# Patient Record
Sex: FEMALE | Race: BLACK OR AFRICAN AMERICAN | NOT HISPANIC OR LATINO | Employment: FULL TIME | URBAN - METROPOLITAN AREA
[De-identification: names, ages, dates, MRNs, and addresses within clinical notes are randomized per-mention and may not be internally consistent; named-entity substitution may affect disease eponyms.]

---

## 2017-08-16 ENCOUNTER — OFFICE VISIT (OUTPATIENT)
Dept: RETAIL CLINIC | Facility: CLINIC | Age: 32
End: 2017-08-16

## 2017-08-16 VITALS
TEMPERATURE: 97.6 F | DIASTOLIC BLOOD PRESSURE: 82 MMHG | HEART RATE: 106 BPM | OXYGEN SATURATION: 98 % | RESPIRATION RATE: 16 BRPM | SYSTOLIC BLOOD PRESSURE: 120 MMHG

## 2017-08-16 DIAGNOSIS — B00.1 HERPES LABIALIS: Primary | ICD-10-CM

## 2017-08-16 PROCEDURE — 99203 OFFICE O/P NEW LOW 30 MIN: CPT | Performed by: NURSE PRACTITIONER

## 2017-08-16 RX ORDER — NORETHINDRONE ACETATE AND ETHINYL ESTRADIOL AND FERROUS FUMARATE 5-7-9-7
KIT ORAL DAILY
COMMUNITY

## 2017-08-16 RX ORDER — ACYCLOVIR 400 MG/1
400 TABLET ORAL
Qty: 25 TABLET | Refills: 0 | Status: SHIPPED | OUTPATIENT
Start: 2017-08-16 | End: 2017-08-21

## 2017-08-16 NOTE — PATIENT INSTRUCTIONS
Cold Sore  A cold sore, also called a fever blister, is a skin infection that is caused by a virus. This infection causes small, fluid-filled sores to form inside of the mouth or on the lips, gums, nose, chin, or cheeks. Cold sores can spread to other parts of the body, such as the eyes or fingers.  Cold sores can be spread or passed from person to person (contagious) until the sores crust over completely. Cold sores can be spread through close contact, such as kissing or sharing a drinking glass.  HOME CARE  Medicines  · Take or apply over-the-counter and prescription medicines only as told by your doctor.  · Use a cotton-tip swab to apply creams or gels to your sores.  Sore Care   · Do not touch the sores or pick the scabs.  · Wash your hands often. Do not touch your eyes without washing your hands first.  · Keep the sores clean and dry.  · If directed, apply ice to the sores:  · Put ice in a plastic bag.  · Place a towel between your skin and the bag.  · Leave the ice on for 20 minutes, 2-3 times per day.  Lifestyle  · Do not kiss, have oral sex, or share personal items until your sores heal.  · Eat a soft, bland diet. Avoid eating hot, cold, or salty foods. These can hurt your mouth.  · Use a straw if it hurts to drink out of a glass.  · Avoid the sun and limit your stress if these things trigger outbreaks. If sun causes cold sores, apply sunscreen on your lips before being out in the sun.  GET HELP IF:  · You have symptoms for more than two weeks.  · You have pus coming from the sores.  · You have redness that is spreading.  · You have pain or irritation in your eye.  · You get sores on your genitals.  · Your sores do not heal within two weeks.  · You get cold sores often.  GET HELP RIGHT AWAY IF:  · You have a fever and your symptoms suddenly get worse.  · You have a headache and confusion.     PLENTY OF REST.  PUSH FLUIDS.  TYLENOL/IBUPROFEN AS NEEDED PER PACKAGE INSTRUCTIONS.  FOLLOW UP WITH PCP NEXT WEEK AS  SCHEDULED.   This information is not intended to replace advice given to you by your health care provider. Make sure you discuss any questions you have with your health care provider.     Document Released: 06/18/2013 Document Revised: 04/10/2017 Document Reviewed: 10/07/2016  [employer] Interactive Patient Education ©2017 [employer]

## 2017-08-16 NOTE — PROGRESS NOTES
Subjective   Patient ID: Amadou Paredes is a 32 y.o. female presents with   Chief Complaint   Patient presents with   • Mouth Lesions       Mouth Lesions    The current episode started more than 1 week ago. The problem occurs continuously. The problem has been unchanged. The problem is moderate. Nothing relieves the symptoms. Nothing aggravates the symptoms. Associated symptoms include mouth sores. Pertinent negatives include no fever, no decreased vision, no double vision, no eye itching, no photophobia, no congestion, no ear discharge, no ear pain, no headaches, no hearing loss, no rhinorrhea, no sore throat, no stridor, no swollen glands, no eye discharge, no eye pain and no eye redness.   PATIENT STATES SHE HAS HAD A LOT OF STRESS AT HOME WITH ILL FAMILY MEMBER.  SINCE THEN, HAS HAD SORES ON HER LIPS.  SHE REPORTS SHE USED TO GET THESE SORES AS A CHILD AS WELL.      Allergies   Allergen Reactions   • Amoxicillin        The following portions of the patient's history were reviewed and updated as appropriate: allergies, current medications, past family history, past medical history, past social history, past surgical history and problem list.      Review of Systems   Constitutional: Negative for fever.   HENT: Positive for mouth sores. Negative for congestion, ear discharge, ear pain, hearing loss, rhinorrhea and sore throat.    Eyes: Negative for double vision, photophobia, pain, discharge, redness and itching.   Respiratory: Negative for stridor.    Neurological: Negative for headaches.       Objective     Vitals:    08/16/17 1218   BP: 120/82   Pulse: 106   Resp: 16   Temp: 97.6 °F (36.4 °C)   SpO2: 98%         Physical Exam   Constitutional: She is oriented to person, place, and time. She appears well-developed and well-nourished. She does not appear ill. No distress.   HENT:   Head: Normocephalic.   Right Ear: Hearing, tympanic membrane, external ear and ear canal normal.   Left Ear: Hearing, tympanic  membrane, external ear and ear canal normal.   Nose: Nose normal. No rhinorrhea or sinus tenderness.   Mouth/Throat: Uvula is midline, oropharynx is clear and moist and mucous membranes are normal. Oral lesions present. No trismus in the jaw. No uvula swelling. Tonsils are 2+ on the right. Tonsils are 2+ on the left. No tonsillar exudate.   MULTIPLE RAISED VESICULAR LESIONS NOTED TO UPPER AND LOWER LIPS.   Eyes: Conjunctivae are normal.   Sclera white.   Neck: No tracheal deviation present.   Cardiovascular: Normal rate, regular rhythm, S1 normal, S2 normal and normal heart sounds.    Pulmonary/Chest: Effort normal and breath sounds normal. No accessory muscle usage. No respiratory distress.   Abdominal: Soft. Bowel sounds are normal. There is no tenderness.   Lymphadenopathy:     She has no cervical adenopathy.   Neurological: She is alert and oriented to person, place, and time.   Skin: Skin is warm and dry.   Vitals reviewed.        Amadou was seen today for mouth lesions.    Diagnoses and all orders for this visit:    Herpes labialis  -     acyclovir (ZOVIRAX) 400 MG tablet; Take 1 tablet by mouth 5 (Five) Times a Day for 5 days. Take no more than 5 doses a day.      PLENTY OF REST.  PUSH FLUIDS.  TYLENOL/IBUPROFEN AS NEEDED PER PACKAGE INSTRUCTIONS.  FOLLOW UP WITH PCP NEXT WEEK AS SCHEDULED.   Follow-up with Primary Care Physician in 48-72 hours if these symptoms worsen or fail to improve as anticipated. Patient verbalizes understanding.